# Patient Record
Sex: FEMALE | Race: WHITE | NOT HISPANIC OR LATINO | URBAN - METROPOLITAN AREA
[De-identification: names, ages, dates, MRNs, and addresses within clinical notes are randomized per-mention and may not be internally consistent; named-entity substitution may affect disease eponyms.]

---

## 2020-03-10 ENCOUNTER — EMERGENCY (EMERGENCY)
Facility: HOSPITAL | Age: 20
LOS: 1 days | Discharge: HOME | End: 2020-03-10
Attending: EMERGENCY MEDICINE | Admitting: EMERGENCY MEDICINE
Payer: COMMERCIAL

## 2020-03-10 VITALS
SYSTOLIC BLOOD PRESSURE: 98 MMHG | HEART RATE: 67 BPM | OXYGEN SATURATION: 98 % | WEIGHT: 125 LBS | HEIGHT: 67 IN | DIASTOLIC BLOOD PRESSURE: 57 MMHG | TEMPERATURE: 98 F | RESPIRATION RATE: 19 BRPM

## 2020-03-10 DIAGNOSIS — L50.0 ALLERGIC URTICARIA: ICD-10-CM

## 2020-03-10 DIAGNOSIS — T78.40XA ALLERGY, UNSPECIFIED, INITIAL ENCOUNTER: ICD-10-CM

## 2020-03-10 PROCEDURE — 99282 EMERGENCY DEPT VISIT SF MDM: CPT

## 2020-03-10 NOTE — ED ADULT TRIAGE NOTE - CHIEF COMPLAINT QUOTE
pt states "I was treated in the ER yesterday for Hives/allergic reaction. I felt better but the hives started coming back. They gave me prednisone, benadryl, pepcid and amoxicillin. I only took the benadryl but my stomach started hurting so I came here".

## 2020-03-10 NOTE — ED ADULT NURSE NOTE - OBJECTIVE STATEMENT
pt states "I was treated in the ER yesterday for Hives/allergic reaction. I felt better but the hives started coming back. They gave me prednisone, benadryl, pepcid and amoxicillin. I only took the benadryl but my stomach started hurting so I came here". Pt states she was given multiple meds in the ER including epi pen, pt was discharged around 1300 today, took a nap and woke up with hives to arms and face. Pt states she took benadryl about 1 hour later with mild relief. Pt also complains of new onset of RLQ pain that started after taking benadryl, pain is described as "squeezing, cramps " Severity 3/10. Pt denies fever, chills or weakness.

## 2020-03-10 NOTE — ED ADULT TRIAGE NOTE - OTHER COMPLAINTS
No DYLON, swallowing, drooling, swelling of face/tongue/uvula noted. Pt speaking in full sentences. No distress noted

## 2020-03-10 NOTE — ED ADULT NURSE NOTE - CHPI ED NUR SYMPTOMS NEG
no tingling/no vomiting/no decreased eating/drinking/no fever/no nausea/no weakness/no dizziness/no chills

## 2020-03-11 VITALS
HEART RATE: 59 BPM | SYSTOLIC BLOOD PRESSURE: 97 MMHG | DIASTOLIC BLOOD PRESSURE: 59 MMHG | TEMPERATURE: 98 F | OXYGEN SATURATION: 100 % | RESPIRATION RATE: 18 BRPM

## 2020-03-11 NOTE — ED PROVIDER NOTE - NSFOLLOWUPINSTRUCTIONS_ED_ALL_ED_FT
Please follow up with your primary care physician and an allergist. If you have any problem getting an appointment this week, please call the ED Referral Coordinator at [phone].  Return to the Emergency Department if you have any new or worsening symptoms, or for any other concerns. Please read below for further information.    Allergic Reaction    An allergic reaction is an abnormal reaction to a substance (allergen) by the body's defense system. Common allergens include medicines, food, insect bites or stings, and blood products. The body releases certain proteins into the blood that can cause a variety of symptoms such as an itchy rash, wheezing, swelling of the face/lips/tongue/throat, abdominal pain, nausea or vomiting. An allergic reaction is usually treated with medication. If your health care provider prescribed you an epinephrine injection device, make sure to keep it with you at all times.    SEEK IMMEDIATE MEDICAL CARE IF YOU HAVE ANY OF THE FOLLOWING SYMPTOMS: allergic reaction severe enough that required you to use epinephrine, tightness in your chest, swelling around your lips/tongue/throat, abdominal pain, vomiting or diarrhea, or lightheadedness/dizziness. These symptoms may represent a serious problem that is an emergency. Do not wait to see if the symptoms will go away. Use your auto-injector pen or anaphylaxis kit as you have been instructed. Call 911 and do not drive yourself to the hospital.

## 2020-03-11 NOTE — ED PROVIDER NOTE - PATIENT PORTAL LINK FT
You can access the FollowMyHealth Patient Portal offered by [facility] by registering at the following website: [email or web address]. By joining [employer]’s FollowMyHealth portal, you will also be able to view your health information using other applications (apps) compatible with our system.

## 2020-03-11 NOTE — ED PROVIDER NOTE - PHYSICAL EXAMINATION
GEN: Well appearing, well nourished, awake, alert, oriented to person, place, time/situation and in no apparent distress.  ENT: Airway patent, Nasal mucosa clear. Mouth with normal mucosa.  EYES: Clear bilaterally.  RESPIRATORY: Breathing comfortably with normal RR.  CARDIAC: Regular rate and rhythm  ABDOMEN: Soft, nontender, +bowel sounds, no rebound, rigidity, or guarding.  MSK: Range of motion is not limited, no deformities noted.  NEURO: Alert and oriented, no focal deficits.  SKIN: Skin normal color for race, warm, dry and intact. No evidence of rash.  PSYCH: Alert and oriented to person, place, time/situation. normal mood and affect. no apparent risk to self or others. GEN: Well appearing, well nourished, awake, alert, oriented to person, place, time/situation and in no apparent distress. NTAF  ENT: Airway patent, Nasal mucosa clear. Mouth with normal mucosa. No stridor, no oropharyngeal swelling or injection.   EYES: Clear bilaterally.  RESPIRATORY: Breathing comfortably with normal RR. No w/c/r.  CARDIAC: Regular rate and rhythm  ABDOMEN: Soft, nontender, +bowel sounds, no rebound, rigidity, or guarding.  MSK: Range of motion is not limited, no deformities noted.  NEURO: Alert and oriented, no focal deficits.  SKIN: Skin normal color for race, warm, dry and intact Mild resolving hives noted to face and left arm  PSYCH: Alert and oriented to person, place, time/situation. normal mood and affect. no apparent risk to self or others.

## 2020-03-11 NOTE — ED PROVIDER NOTE - OBJECTIVE STATEMENT
20 y/o F pt with no pertinent PMHx and PSHx presents to ED c/o allergic reaction. Per pt, she had an allergic rxn yesterday after eating gluten-free cookies and went to [facility] ER where she was treated with Decadron, Epipen, Benadryl, and Pepcid, then observed for several hours and discharged with prescription. However, pt states 5hours ago she developed hives again with throat tightness and took Benadryl, but not Prednisone or Pepcid as she was confused about how to take them. Pt relates her symptoms have largely resolved by now, but was just worried about her symptoms getting worse again. Pt denies other new exposures, fever, URI symptoms, dysphonia, dysphagia, wheezes, stridors, or any other acute complaints. 20 y/o F pt with no pertinent PMHx and PSHx presents to ED c/o allergic reaction. Per pt, she had an allergic rxn yesterday after eating gluten-free cookies and went to [facility] ER where she was treated with Decadron, Epipen, Benadryl, and Pepcid, then observed for several hours and discharged with prescription. However, pt states 5hours ago she developed hives again with mild throat tightness and took Benadryl, but she did not Prednisone or Pepcid as she was confused about how/when to take them. Pt relates her symptoms have largely resolved by now, but was just worried about her symptoms getting worse again. Pt denies other new exposures, fever, URI symptoms, dysphonia, dysphagia, wheezes, stridors, or any other acute complaints.

## 2020-03-11 NOTE — ED PROVIDER NOTE - CLINICAL SUMMARY MEDICAL DECISION MAKING FREE TEXT BOX
18 y/o F pt presents to ED with recurrent hives today at home. Pt took Benadryl with almost near resolution of symptoms, On exam, VS stable, airway intact, no respiratory distress, nearly resolved hives noted on skin. Pt has Pepcid and Prednisone prescription with her, was instructed to take them now. Pt was educated on how to take remainder of her prescription, feels better now, and is stable for discharge. 18 y/o F pt presents to ED with recurrent hives today at home. Pt took Benadryl with almost near resolution of symptoms, On exam, VS stable, airway intact, no respiratory distress, nearly resolved hives noted on skin. Pt has Pepcid and Prednisone prescription with her, was instructed to take them now. No further workup or treatment indicated at this time. Pt was educated on how to take remainder of her prescription, feels better now, and is stable for discharge.